# Patient Record
Sex: MALE | Race: WHITE | Employment: FULL TIME | URBAN - NONMETROPOLITAN AREA
[De-identification: names, ages, dates, MRNs, and addresses within clinical notes are randomized per-mention and may not be internally consistent; named-entity substitution may affect disease eponyms.]

---

## 2018-03-09 ENCOUNTER — HOSPITAL ENCOUNTER (EMERGENCY)
Age: 33
Discharge: HOME | End: 2018-03-09
Payer: COMMERCIAL

## 2018-03-09 VITALS
HEIGHT: 64 IN | WEIGHT: 170 LBS | HEART RATE: 74 BPM | TEMPERATURE: 98.7 F | SYSTOLIC BLOOD PRESSURE: 138 MMHG | OXYGEN SATURATION: 100 % | RESPIRATION RATE: 14 BRPM | BODY MASS INDEX: 29.02 KG/M2 | DIASTOLIC BLOOD PRESSURE: 89 MMHG

## 2018-03-09 DIAGNOSIS — G44.209 TENSION HEADACHE: Primary | ICD-10-CM

## 2018-03-09 PROCEDURE — 6370000000 HC RX 637 (ALT 250 FOR IP): Performed by: NURSE PRACTITIONER

## 2018-03-09 PROCEDURE — 6360000002 HC RX W HCPCS: Performed by: NURSE PRACTITIONER

## 2018-03-09 PROCEDURE — 96372 THER/PROPH/DIAG INJ SC/IM: CPT

## 2018-03-09 PROCEDURE — 99284 EMERGENCY DEPT VISIT MOD MDM: CPT

## 2018-03-09 RX ORDER — CYCLOBENZAPRINE HCL 10 MG
10 TABLET ORAL 3 TIMES DAILY PRN
Qty: 15 TABLET | Refills: 0 | Status: SHIPPED | OUTPATIENT
Start: 2018-03-09 | End: 2022-03-01

## 2018-03-09 RX ORDER — METOCLOPRAMIDE 10 MG/1
10 TABLET ORAL ONCE
Status: COMPLETED | OUTPATIENT
Start: 2018-03-09 | End: 2018-03-09

## 2018-03-09 RX ORDER — KETOROLAC TROMETHAMINE 30 MG/ML
30 INJECTION, SOLUTION INTRAMUSCULAR; INTRAVENOUS ONCE
Status: COMPLETED | OUTPATIENT
Start: 2018-03-09 | End: 2018-03-09

## 2018-03-09 RX ORDER — DIPHENHYDRAMINE HCL 25 MG
50 TABLET ORAL ONCE
Status: COMPLETED | OUTPATIENT
Start: 2018-03-09 | End: 2018-03-09

## 2018-03-09 RX ORDER — ORPHENADRINE CITRATE 30 MG/ML
60 INJECTION INTRAMUSCULAR; INTRAVENOUS ONCE
Status: COMPLETED | OUTPATIENT
Start: 2018-03-09 | End: 2018-03-09

## 2018-03-09 RX ADMIN — METOCLOPRAMIDE 10 MG: 10 TABLET ORAL at 22:40

## 2018-03-09 RX ADMIN — ORPHENADRINE CITRATE 60 MG: 30 INJECTION INTRAMUSCULAR; INTRAVENOUS at 22:42

## 2018-03-09 RX ADMIN — KETOROLAC TROMETHAMINE 30 MG: 30 INJECTION, SOLUTION INTRAMUSCULAR at 22:42

## 2018-03-09 RX ADMIN — DIPHENHYDRAMINE HCL 50 MG: 25 TABLET ORAL at 22:40

## 2018-03-09 ASSESSMENT — PAIN DESCRIPTION - FREQUENCY
FREQUENCY: CONTINUOUS
FREQUENCY: CONTINUOUS

## 2018-03-09 ASSESSMENT — PAIN DESCRIPTION - ONSET: ONSET: ON-GOING

## 2018-03-09 ASSESSMENT — PAIN SCALES - GENERAL
PAINLEVEL_OUTOF10: 2
PAINLEVEL_OUTOF10: 7
PAINLEVEL_OUTOF10: 7

## 2018-03-09 ASSESSMENT — PAIN DESCRIPTION - LOCATION
LOCATION: HEAD;NECK
LOCATION: HEAD;NECK

## 2018-03-09 ASSESSMENT — ENCOUNTER SYMPTOMS
PHOTOPHOBIA: 0
SHORTNESS OF BREATH: 0
NAUSEA: 0
VOMITING: 0

## 2018-03-09 ASSESSMENT — PAIN DESCRIPTION - DESCRIPTORS
DESCRIPTORS: HEADACHE
DESCRIPTORS: HEADACHE

## 2018-03-09 ASSESSMENT — PAIN DESCRIPTION - PROGRESSION: CLINICAL_PROGRESSION: GRADUALLY IMPROVING

## 2018-03-09 ASSESSMENT — PAIN DESCRIPTION - PAIN TYPE
TYPE: ACUTE PAIN
TYPE: ACUTE PAIN

## 2018-03-09 ASSESSMENT — PAIN DESCRIPTION - ORIENTATION: ORIENTATION: LEFT

## 2018-03-10 NOTE — ED PROVIDER NOTES
[facility] EMERGENCY DEPT      CHIEF COMPLAINT       Chief Complaint   Patient presents with    Migraine       Nurses Notes reviewed and I agree except as noted in the HPI. HISTORY OF PRESENT ILLNESS    Raegan Bose is a 34 y.o. male with a past medical history of migraines who presents to the Emergency Department for evaluation of a headache. The patient reports that his headache began five hours PTA and has gradually worsened with time. The pain is located in the left side of his posterior neck where he states there are currently spasms with radiation up into the left side of his head to his ear and then to his left eye. Patient describes his pain as constant throbbing that he currently rates as a 7/10 in severity without modifying factors. He states that his current headache is consistent with migraines he has had in the past. The patient denies any fever, chills, dizziness, lightheadedness, weakness, numbness/tingling, neck stiffness, vision changes, nausea, or vomiting. Patient adds that he has been having these headaches every day for a week. He is currently taking Amoxicillin for left otitis media. No further complaints at initial time of encounter. HPI was provided by the patient. REVIEW OF SYSTEMS     Review of Systems   Constitutional: Negative for activity change, appetite change, chills, diaphoresis, fatigue and fever. Eyes: Negative for photophobia and visual disturbance. Respiratory: Negative for shortness of breath. Cardiovascular: Negative for chest pain. Gastrointestinal: Negative for nausea and vomiting. Musculoskeletal: Positive for neck pain (spasms). Negative for neck stiffness. Allergic/Immunologic: Negative for immunocompromised state. Neurological: Positive for headaches. Negative for dizziness, weakness, light-headedness and numbness. Hematological: Does not bruise/bleed easily. Psychiatric/Behavioral: Negative for confusion.         PAST MEDICAL HISTORY Extremities well perfused; Good strength appreciated in all muscle groups. Lymphadenopathy:     He has no cervical adenopathy. Neurological: He is alert and oriented to person, place, and time. He has normal strength. No cranial nerve deficit or sensory deficit. Gait normal. GCS eye subscore is 4. GCS verbal subscore is 5. GCS motor subscore is 6. Cranial nerves II-XII intact. Skin: Skin is warm, dry and intact. No bruising and no rash noted. He is not diaphoretic. No pallor. Psychiatric: He has a normal mood and affect. His speech is normal and behavior is normal. Thought content normal. Cognition and memory are normal.   Nursing note and vitals reviewed. DIFFERENTIAL DIAGNOSIS:   Migraine headache, tension headache; do not suspect meningitis    DIAGNOSTIC RESULTS     EKG: All EKG's are interpreted by the Emergency Department Physician who either signs or Co-signs this chart in the absence of a cardiologist.  None    RADIOLOGY: non-plain film images(s) such as CT, Ultrasound and MRI are read by the radiologist.  Plain radiographic images are visualized and preliminarily interpreted by the emergency physician unless otherwise stated below. None    LABS:   Labs Reviewed - No data to display      EMERGENCY DEPARTMENT COURSE:   Vitals:    Vitals:    03/09/18 2206 03/09/18 2314   BP: (!) 146/97 138/89   Pulse: 71 74   Resp: 14 14   Temp: 98.7 °F (37.1 °C)    TempSrc: Oral    SpO2: 100% 100%   Weight: 170 lb (77.1 kg)    Height: 5' 4\" (1.626 m)        MDM    Medications   ketorolac (TORADOL) injection 30 mg (30 mg Intramuscular Given 3/9/18 2242)   orphenadrine (NORFLEX) injection 60 mg (60 mg Intramuscular Given 3/9/18 2242)   metoclopramide (REGLAN) tablet 10 mg (10 mg Oral Given 3/9/18 2240)   diphenhydrAMINE (BENADRYL) tablet 50 mg (50 mg Oral Given 3/9/18 2240)       Patient was seen and evaluated in the emergency department for reports of headache on the left side.   Physical exam reveals that the patient's pain originated in his neck and radiated into his head which is suspicious for a tension headache. Patient reports that he has been under stress with his new child and has been also lifting her frequently. He is medicated with a migraine cocktail as well as a muscle relaxer which he states provided significant relief. He'll be discharged home with a prescription for Flexeril is instructed to continue his over-the-counter treatments that he has been using. She is instructed to return for any new or worsening symptoms including fever and to follow-up with his PCP on an outpatient basis. CRITICAL CARE:   None    CONSULTS:  None    PROCEDURES:  None    FINAL IMPRESSION      1. Tension headache          DISPOSITION/PLAN   The patient was discharged. PATIENT REFERRED TO:  Romina Lorenzo  [address] [place] [address]  [phone]    Call in 3 days        DISCHARGE MEDICATIONS:  New Prescriptions    CYCLOBENZAPRINE (FLEXERIL) 10 MG TABLET    Take 1 tablet by mouth 3 times daily as needed for Muscle spasms . Do not drive or operate heavy machinery while taking this medication. (Please note that portions of this note were completed with a voice recognition program.  Efforts were made to edit the dictations but occasionally words are mis-transcribed.)    Scribe:  Baron Soto 3/9/18 10:18 PM Scribing for and in the presence of Pablo Arenas CNP. Signed by: Garry6 E [place]over, 3/9/18 11:47 PM    Provider:  I personally performed the services described in the documentation, reviewed and edited the documentation which was dictated to the scribe in my presence, and it accurately records my words and actions.     Pablo Arenas CNP 03/09/18 11:47 PM    Eulalio Bustos 15, APRN  03/09/18 8844

## 2018-03-13 ENCOUNTER — HOSPITAL ENCOUNTER (EMERGENCY)
Age: 33
Discharge: HOME | End: 2018-03-14
Payer: COMMERCIAL

## 2018-03-13 DIAGNOSIS — G44.019 EPISODIC CLUSTER HEADACHE, NOT INTRACTABLE: Primary | ICD-10-CM

## 2018-03-13 PROCEDURE — 99283 EMERGENCY DEPT VISIT LOW MDM: CPT

## 2018-03-13 ASSESSMENT — PAIN DESCRIPTION - LOCATION: LOCATION: HEAD

## 2018-03-13 ASSESSMENT — PAIN SCALES - GENERAL: PAINLEVEL_OUTOF10: 8

## 2018-03-14 VITALS
OXYGEN SATURATION: 99 % | TEMPERATURE: 97.8 F | SYSTOLIC BLOOD PRESSURE: 136 MMHG | HEART RATE: 75 BPM | RESPIRATION RATE: 18 BRPM | DIASTOLIC BLOOD PRESSURE: 81 MMHG

## 2018-03-14 PROCEDURE — 6360000002 HC RX W HCPCS: Performed by: PHYSICIAN ASSISTANT

## 2018-03-14 PROCEDURE — 96372 THER/PROPH/DIAG INJ SC/IM: CPT

## 2018-03-14 RX ORDER — ONDANSETRON 4 MG/1
4 TABLET, ORALLY DISINTEGRATING ORAL ONCE
Status: COMPLETED | OUTPATIENT
Start: 2018-03-14 | End: 2018-03-14

## 2018-03-14 RX ORDER — BUTALBITAL, ACETAMINOPHEN AND CAFFEINE 300; 40; 50 MG/1; MG/1; MG/1
1 CAPSULE ORAL EVERY 4 HOURS PRN
Qty: 84 CAPSULE | Refills: 0 | Status: SHIPPED | OUTPATIENT
Start: 2018-03-14

## 2018-03-14 RX ORDER — ONDANSETRON 4 MG/1
4 TABLET, ORALLY DISINTEGRATING ORAL EVERY 8 HOURS PRN
Qty: 10 TABLET | Refills: 0 | Status: SHIPPED | OUTPATIENT
Start: 2018-03-14 | End: 2022-03-01

## 2018-03-14 RX ORDER — SUMATRIPTAN 6 MG/.5ML
6 INJECTION, SOLUTION SUBCUTANEOUS ONCE
Status: COMPLETED | OUTPATIENT
Start: 2018-03-14 | End: 2018-03-14

## 2018-03-14 RX ADMIN — ONDANSETRON 4 MG: 4 TABLET, ORALLY DISINTEGRATING ORAL at 00:23

## 2018-03-14 RX ADMIN — SUMATRIPTAN 6 MG: 6 INJECTION, SOLUTION SUBCUTANEOUS at 00:23

## 2018-03-14 ASSESSMENT — ENCOUNTER SYMPTOMS
RHINORRHEA: 0
ABDOMINAL PAIN: 0
BACK PAIN: 0
SORE THROAT: 0
PHOTOPHOBIA: 1
EYE DISCHARGE: 0
DIARRHEA: 0
NAUSEA: 1
VOMITING: 1
WHEEZING: 0
EYE REDNESS: 0
SHORTNESS OF BREATH: 0
COUGH: 0

## 2018-03-14 NOTE — ED PROVIDER NOTES
reasonable. The headache was not thunder-clap or abrupt in onset. There have been no abnormal neurological findings on exam or re-exam. There has been no fever or meningeal signs suspicious for meningitis or intracranial infection. The patient and/or family and I have discussed the diagnosis and risks, and we agree with discharging home to follow-up with their primary doctor. We also discussed returning to the Emergency Department immediately if new or worsening symptoms occur. We have discussed the symptoms which are most concerning (e.g., changing or worsening pain, weakness, vomiting, neck stiffness,  fever) that necessitate immediate return. CRITICAL CARE:   None    CONSULTS:  None    PROCEDURES:  None    FINAL IMPRESSION      1. Episodic cluster headache, not intractable          DISPOSITION/PLAN     1. Episodic cluster headache, not intractable        PATIENT REFERRED TO:  Loree Sepulveda[address]  [phone]    Schedule an appointment as soon as possible for a visit in 1 day      Janelle Bruno MD  [address]  [phone]    Schedule an appointment as soon as possible for a visit         DISCHARGE MEDICATIONS:  New Prescriptions    BUTALBITAL-APAP-CAFFEINE (FIORICET) -40 MG CAPS PER CAPSULE    Take 1 capsule by mouth every 4 hours as needed for Headaches    ONDANSETRON (ZOFRAN ODT) 4 MG DISINTEGRATING TABLET    Take 1 tablet by mouth every 8 hours as needed for Nausea       (Please note that portions of this note were completed with a voice recognition program.  Efforts were made to edit the dictations but occasionally words are mis-transcribed.)    Scribe:  Tanya Maciel 3/14/18 12:11 AM Scribing for and in the presence of ALLAN Davila.     Signed by: [place]auer, 03/14/18 1:06 AM    Provider:  I personally performed the services described in the documentation, reviewed and edited the documentation which was dictated to the scribe in my presence, and it accurately records my words and actions.     ALLAN Davila 03/14/18 1:06 AM    ALLAN Davila PA-C  03/14/18 0111

## 2018-05-04 ENCOUNTER — HOSPITAL ENCOUNTER (OUTPATIENT)
Dept: MRI IMAGING | Age: 33
Discharge: HOME | End: 2018-05-04
Payer: COMMERCIAL

## 2018-05-04 DIAGNOSIS — R51.9 NONINTRACTABLE EPISODIC HEADACHE, UNSPECIFIED HEADACHE TYPE: ICD-10-CM

## 2018-05-04 PROCEDURE — 70551 MRI BRAIN STEM W/O DYE: CPT

## 2021-12-27 ENCOUNTER — APPOINTMENT (OUTPATIENT)
Dept: CT IMAGING | Age: 36
End: 2021-12-27
Payer: COMMERCIAL

## 2021-12-27 ENCOUNTER — HOSPITAL ENCOUNTER (EMERGENCY)
Age: 36
Discharge: HOME | End: 2021-12-27
Attending: EMERGENCY MEDICINE
Payer: COMMERCIAL

## 2021-12-27 VITALS
TEMPERATURE: 98.4 F | HEART RATE: 71 BPM | DIASTOLIC BLOOD PRESSURE: 80 MMHG | RESPIRATION RATE: 18 BRPM | SYSTOLIC BLOOD PRESSURE: 132 MMHG | BODY MASS INDEX: 26.66 KG/M2 | WEIGHT: 160 LBS | OXYGEN SATURATION: 100 % | HEIGHT: 65 IN

## 2021-12-27 DIAGNOSIS — R20.2 PARESTHESIAS: Primary | ICD-10-CM

## 2021-12-27 LAB
ALBUMIN SERPL-MCNC: 4.7 G/DL (ref 3.5–5.1)
ALP BLD-CCNC: 60 U/L (ref 38–126)
ALT SERPL-CCNC: 11 U/L (ref 11–66)
AMPHETAMINE+METHAMPHETAMINE URINE SCREEN: NEGATIVE
ANION GAP SERPL CALCULATED.3IONS-SCNC: 12 MEQ/L (ref 8–16)
AST SERPL-CCNC: 13 U/L (ref 5–40)
BARBITURATE QUANTITATIVE URINE: POSITIVE
BASOPHILS # BLD: 0.4 %
BASOPHILS ABSOLUTE: 0 THOU/MM3 (ref 0–0.1)
BENZODIAZEPINE QUANTITATIVE URINE: NEGATIVE
BILIRUB SERPL-MCNC: 0.3 MG/DL (ref 0.3–1.2)
BILIRUBIN URINE: NEGATIVE
BLOOD, URINE: NEGATIVE
BUN BLDV-MCNC: 18 MG/DL (ref 7–22)
CALCIUM SERPL-MCNC: 9.9 MG/DL (ref 8.5–10.5)
CANNABINOID QUANTITATIVE URINE: POSITIVE
CHARACTER, URINE: CLEAR
CHLORIDE BLD-SCNC: 101 MEQ/L (ref 98–111)
CO2: 28 MEQ/L (ref 23–33)
COCAINE METABOLITE QUANTITATIVE URINE: NEGATIVE
COLOR: YELLOW
CREAT SERPL-MCNC: 0.9 MG/DL (ref 0.4–1.2)
EOSINOPHIL # BLD: 2 %
EOSINOPHILS ABSOLUTE: 0.2 THOU/MM3 (ref 0–0.4)
ERYTHROCYTE [DISTWIDTH] IN BLOOD BY AUTOMATED COUNT: 12.3 % (ref 11.5–14.5)
ERYTHROCYTE [DISTWIDTH] IN BLOOD BY AUTOMATED COUNT: 39.3 FL (ref 35–45)
ETHYL ALCOHOL, SERUM: < 0.01 %
GFR SERPL CREATININE-BSD FRML MDRD: > 90 ML/MIN/1.73M2
GLUCOSE BLD-MCNC: 102 MG/DL (ref 70–108)
GLUCOSE URINE: NEGATIVE MG/DL
HCT VFR BLD CALC: 40.8 % (ref 42–52)
HEMOGLOBIN: 13.6 GM/DL (ref 14–18)
IMMATURE GRANS (ABS): 0.03 THOU/MM3 (ref 0–0.07)
IMMATURE GRANULOCYTES: 0.3 %
KETONES, URINE: ABNORMAL
LEUKOCYTE ESTERASE, URINE: NEGATIVE
LYMPHOCYTES # BLD: 28.2 %
LYMPHOCYTES ABSOLUTE: 2.7 THOU/MM3 (ref 1–4.8)
MAGNESIUM: 2.1 MG/DL (ref 1.6–2.4)
MCH RBC QN AUTO: 29.1 PG (ref 26–33)
MCHC RBC AUTO-ENTMCNC: 33.3 GM/DL (ref 32.2–35.5)
MCV RBC AUTO: 87.4 FL (ref 80–94)
MONOCYTES # BLD: 6.9 %
MONOCYTES ABSOLUTE: 0.7 THOU/MM3 (ref 0.4–1.3)
NITRITE, URINE: NEGATIVE
NUCLEATED RED BLOOD CELLS: 0 /100 WBC
OPIATES, URINE: NEGATIVE
OSMOLALITY CALCULATION: 283.4 MOSMOL/KG (ref 275–300)
OXYCODONE: NEGATIVE
PH UA: 5.5 (ref 5–9)
PHENCYCLIDINE QUANTITATIVE URINE: NEGATIVE
PLATELET # BLD: 210 THOU/MM3 (ref 130–400)
PMV BLD AUTO: 9.9 FL (ref 9.4–12.4)
POTASSIUM SERPL-SCNC: 3.9 MEQ/L (ref 3.5–5.2)
PROTEIN UA: NEGATIVE
RBC # BLD: 4.67 MILL/MM3 (ref 4.7–6.1)
SEG NEUTROPHILS: 62.2 %
SEGMENTED NEUTROPHILS ABSOLUTE COUNT: 6 THOU/MM3 (ref 1.8–7.7)
SODIUM BLD-SCNC: 141 MEQ/L (ref 135–145)
SPECIFIC GRAVITY, URINE: 1.02 (ref 1–1.03)
TOTAL PROTEIN: 6.8 G/DL (ref 6.1–8)
UROBILINOGEN, URINE: 0.2 EU/DL (ref 0–1)
WBC # BLD: 9.6 THOU/MM3 (ref 4.8–10.8)

## 2021-12-27 PROCEDURE — 36415 COLL VENOUS BLD VENIPUNCTURE: CPT

## 2021-12-27 PROCEDURE — 81003 URINALYSIS AUTO W/O SCOPE: CPT

## 2021-12-27 PROCEDURE — 72125 CT NECK SPINE W/O DYE: CPT

## 2021-12-27 PROCEDURE — 70450 CT HEAD/BRAIN W/O DYE: CPT

## 2021-12-27 PROCEDURE — 85025 COMPLETE CBC W/AUTO DIFF WBC: CPT

## 2021-12-27 PROCEDURE — 93005 ELECTROCARDIOGRAM TRACING: CPT | Performed by: EMERGENCY MEDICINE

## 2021-12-27 PROCEDURE — 80053 COMPREHEN METABOLIC PANEL: CPT

## 2021-12-27 PROCEDURE — 99282 EMERGENCY DEPT VISIT SF MDM: CPT

## 2021-12-27 PROCEDURE — 80307 DRUG TEST PRSMV CHEM ANLYZR: CPT

## 2021-12-27 PROCEDURE — 83735 ASSAY OF MAGNESIUM: CPT

## 2021-12-27 PROCEDURE — 82077 ASSAY SPEC XCP UR&BREATH IA: CPT

## 2021-12-27 ASSESSMENT — ENCOUNTER SYMPTOMS
COUGH: 0
ABDOMINAL PAIN: 0
BACK PAIN: 0
VOMITING: 0
SHORTNESS OF BREATH: 0

## 2021-12-28 LAB
EKG ATRIAL RATE: 63 BPM
EKG P AXIS: 74 DEGREES
EKG P-R INTERVAL: 120 MS
EKG Q-T INTERVAL: 380 MS
EKG QRS DURATION: 88 MS
EKG QTC CALCULATION (BAZETT): 388 MS
EKG R AXIS: 65 DEGREES
EKG T AXIS: 44 DEGREES
EKG VENTRICULAR RATE: 63 BPM

## 2021-12-28 NOTE — ED NOTES
Pt refusing COVID swab.  Urine specimen collected and sent to the lab     Rosey Astudillo RN  12/27/21 7901

## 2021-12-28 NOTE — ED PROVIDER NOTES
[facility] [address] [address] EMERGENCY DEPT    EMERGENCY MEDICINE     Pt Name: Kamila Guillen  MRN: [id number]  [place] 1985  Date of evaluation: 12/27/2021  Provider: Noe Bryant MD    CHIEF COMPLAINT       Chief Complaint   Patient presents with    Tingling       HISTORY OF PRESENT ILLNESS    Kamila Guillen is a pleasant 39 y.o. male   Presents to the emergency department from home complaining of tingling to lips and b/l hands and b/l feet. Symptoms started yesterday. He denies any pain, denies any neck/back pain, denies headache, denies cp, denies fever, denies n/v.  He reports the symptoms improve when hes walking around. No other complaints, no other known exacerbating/relieving factors. Triage notes and Nursing notes were reviewed by myself. Any discrepancies are addressed above. PAST MEDICAL HISTORY   No past medical history on file. SURGICAL HISTORY     No past surgical history on file. CURRENT MEDICATIONS       Previous Medications    BUTALBITAL-APAP-CAFFEINE (FIORICET) -40 MG CAPS PER CAPSULE    Take 1 capsule by mouth every 4 hours as needed for Headaches    CYCLOBENZAPRINE (FLEXERIL) 10 MG TABLET    Take 1 tablet by mouth 3 times daily as needed for Muscle spasms . Do not drive or operate heavy machinery while taking this medication. ONDANSETRON (ZOFRAN ODT) 4 MG DISINTEGRATING TABLET    Take 1 tablet by mouth every 8 hours as needed for Nausea       ALLERGIES     Patient has no known allergies. FAMILY HISTORY     No family history on file.      SOCIAL HISTORY       Social History     Socioeconomic History    Marital status:      Spouse name: Not on file    Number of children: Not on file    Years of education: Not on file    Highest education level: Not on file   Occupational History    Not on file   Tobacco Use    Smoking status: Not on file    Smokeless tobacco: Not on file   Substance and Sexual Activity    Alcohol use: Not on file    Drug use: Not on file  Sexual activity: Not on file   Other Topics Concern    Not on file   Social History Narrative    Not on file     Social Determinants of Health     Financial Resource Strain:     Difficulty of Paying Living Expenses: Not on file   Food Insecurity:     Worried About Running Out of Food in the Last Year: Not on file    Jamison of Food in the Last Year: Not on file   Transportation Needs:     Lack of Transportation (Medical): Not on file    Lack of Transportation (Non-Medical): Not on file   Physical Activity:     Days of Exercise per Week: Not on file    Minutes of Exercise per Session: Not on file   Stress:     Feeling of Stress : Not on file   Social Connections:     Frequency of Communication with Friends and Family: Not on file    Frequency of Social Gatherings with Friends and Family: Not on file    Attends [employer] Services: Not on file    Active Member of [address] [place] [address] [employer] or Organizations: Not on file    Attends Club or Organization Meetings: Not on file    Marital Status: Not on file   Intimate Partner Violence:     Fear of Current or Ex-Partner: Not on file    Emotionally Abused: Not on file    Physically Abused: Not on file    Sexually Abused: Not on file   Housing Stability:     Unable to Pay for Housing in the Last Year: Not on file    Number of [place] in the Last Year: Not on file    Unstable Housing in the Last Year: Not on file       REVIEW OF SYSTEMS     Review of Systems   Constitutional: Negative for chills and fever. Respiratory: Negative for cough and shortness of breath. Cardiovascular: Negative for chest pain and leg swelling. Gastrointestinal: Negative for abdominal pain and vomiting. Musculoskeletal: Negative for back pain and neck pain. Skin: Negative for rash and wound. Neurological: Negative for weakness. All other systems reviewed and are negative. Except as noted above the remainder of the review of systems was reviewed and is.    PHYSICAL EXAM (up to 7 for level 4, 8 or more for level 5)     ED Triage Vitals   BP Temp Temp src Pulse Resp SpO2 Height Weight   12/27/21 2031 12/27/21 2029 -- 12/27/21 2029 12/27/21 2029 12/27/21 2029 12/27/21 2029 12/27/21 2029   132/80 98.4 °F (36.9 °C)  71 18 100 % 5' 5\" (1.651 m) 160 lb (72.6 kg)       Physical Exam  Vitals and nursing note reviewed. Constitutional:       General: He is awake. HENT:      Head: Normocephalic and atraumatic. Mouth/Throat:      Mouth: Mucous membranes are moist.   Eyes:      General: Lids are normal.      Conjunctiva/sclera: Conjunctivae normal.   Neck:      Vascular: No JVD. Trachea: No tracheal deviation. Cardiovascular:      Rate and Rhythm: Normal rate and regular rhythm. Pulses: Normal pulses. Heart sounds: No murmur heard. No friction rub. No gallop. Pulmonary:      Effort: Pulmonary effort is normal.      Breath sounds: Normal breath sounds. No wheezing, rhonchi or rales. Abdominal:      Palpations: Abdomen is soft. Tenderness: There is no abdominal tenderness. Musculoskeletal:      Cervical back: Neck supple. Skin:     General: Skin is warm. Findings: No rash. Neurological:      General: No focal deficit present. Mental Status: He is alert. Motor: No weakness. Comments: cn2-12 intact, normal sensation to LT in all ext equal b/l, 5/5 strength in all ext, normal gait, normal speech, normal coordination with finger to nose   Psychiatric:         Behavior: Behavior is cooperative.          DIAGNOSTIC RESULTS     EKG:(none if blank)  All E[facility]'s are [facility] who either signs or Co-signs this chart in the absence of a cardiologist.        RADIOLOGY: (none if blank)   Interpretation per the Radiologistbelow, if available at the time of this note:    CT CERVICAL SPINE WO CONTRAST   Final Result   Normal noncontrast CT cervical spine            **This report has been created using voice recognition software. It may contain minor errors which are inherent in voice recognition technology. **      Final report electronically signed by Dr. Tiffany Kaur on 12/27/2021 9:50 PM      CT HEAD WO CONTRAST   Final Result   Normal noncontrast CT brain. No acute intracranial process. .          **This report has been created using voice recognition software. It may contain minor errors which are inherent in voice recognition technology. **      Final report electronically signed by Dr. Tiffany Kaur on 12/27/2021 9:49 PM          LABS:  Labs Reviewed   CBC WITH AUTO DIFFERENTIAL - Abnormal; Notable for the following components:       Result Value    RBC 4.67 (*)     Hemoglobin 13.6 (*)     Hematocrit 40.8 (*)     All other components within normal limits   MAGNESIUM   COMPREHENSIVE METABOLIC PANEL   ETHANOL   URINE DRUG SCREEN   ANION GAP   GLOMERULAR FILTRATION RATE, ESTIMATED   OSMOLALITY   URINE RT REFLEX TO CULTURE       All other labs were within normal range or not returned as of this dictation. Please note, any cultures that may have been sent were not resulted at the time of this patient visit. EMERGENCY DEPARTMENT COURSE andMedical Decision Making:     MDM/   Pt presents to the ER with nonspecific paresthesias in b/l hands and feet in stalking/glove distribution as well as lips. Resolved here when walking. Unclear etiology, symptoms not c/w acs/cva/sepsis, patient stable for dc home, counsled regarding need for f/u and ER return precautions. Strict returnprecautions and follow up instructions were discussed with the patient with which the patient agrees      ED Medications administered this visit:  Medications - No data to display      Procedures: (None if blank)    The patient was seen at a time when COVID-19 was severely surging in the region, placing a significant strain on available resources and personnel. CLINICAL IMPRESSION     1.  Paresthesias          DISPOSITION/PLAN DISPOSITION Decision To Discharge 12/27/2021 10:58:15 PM      PATIENT REFERRED TO:  Jaydon Sepulveda[address]  [phone]    In 2 days      Ford Campbell, [address] [phone]    In 2 days        DISCHARGE MEDICATIONS:  New Prescriptions    No medications on file     @[facility]([id number]:LAST:1)      (Please note that portions of this note were completed with a voice recognition program.  Efforts were made to edit the dictations but occasionallywords are mis-transcribed.)      Lesley Sarkar MD,FACEP (electronically signed)  Attending Physician, Emergency Department       Joe Stone MD  12/27/21 6887

## 2021-12-28 NOTE — ED TRIAGE NOTES
To ED from home with complaints of tingling in bilateral hands, left leg and bilateral arms. Patient states slight pain in finger nail beds. Patient vital signs stable and respirations unlabored.

## 2022-03-01 ENCOUNTER — INITIAL CONSULT (OUTPATIENT)
Dept: NEUROLOGY | Age: 37
End: 2022-03-01
Payer: COMMERCIAL

## 2022-03-01 VITALS
BODY MASS INDEX: 26.82 KG/M2 | SYSTOLIC BLOOD PRESSURE: 126 MMHG | DIASTOLIC BLOOD PRESSURE: 88 MMHG | WEIGHT: 161 LBS | HEIGHT: 65 IN | HEART RATE: 56 BPM

## 2022-03-01 DIAGNOSIS — R20.0 NUMBNESS: Primary | ICD-10-CM

## 2022-03-01 PROCEDURE — 99205 OFFICE O/P NEW HI 60 MIN: CPT | Performed by: PSYCHIATRY & NEUROLOGY

## 2022-03-01 RX ORDER — PAROXETINE HYDROCHLORIDE 12.5 MG/1
12.5 TABLET, FILM COATED, EXTENDED RELEASE ORAL EVERY MORNING
COMMUNITY

## 2022-03-01 NOTE — PATIENT INSTRUCTIONS
1. MRI brain W/WO contrast  2. VZV, HSV, EBV titers  3. Copper level  4. Iron studies  5. Vitamin B12, folate  6. Vitamin B6 level  7. Call with any new symptoms or concerns. 8. Follow up in 6 weeks.

## 2022-03-15 NOTE — PROGRESS NOTES
Chief Complaint   Patient presents with    Consultation     paresthesias       Carmelo Quiroz is a 39 y.o. male who presents today for evaluation of  Intermittent numbness and tingling in his tongue, lips, and then started in his bilateral hands and feet for the past 3 months. He is experiencing symptoms a few times each day. Symptoms can last a few minutes and then resolve. He does have previous history of migraine headaches however he is not experiencing any headache associated with his symptoms. He feels his symptoms are made worse when he is nervous or anxious. No weakness. No incontinence of bowel or bladder. He denies any cold or flu like illness prior to onset of these symptoms. No new changes in medication. No tick bites. CT head done 12/27/21 was normal. He denies chest pain. No shortness of breath, no neck pain. No vision changes. No dysphagia. No fever. No rash. No weight loss. History provided by patient. Past Medical History:   Diagnosis Date    Migraine        There is no problem list on file for this patient. No Known Allergies    Current Outpatient Medications   Medication Sig Dispense Refill    PARoxetine (PAXIL CR) 12.5 MG extended release tablet Take 12.5 mg by mouth every morning      butalbital-APAP-caffeine (FIORICET) -40 MG CAPS per capsule Take 1 capsule by mouth every 4 hours as needed for Headaches 84 capsule 0     No current facility-administered medications for this visit.        Social History     Socioeconomic History    Marital status:      Spouse name: None    Number of children: None    Years of education: None    Highest education level: None   Occupational History    None   Tobacco Use    Smoking status: Never Smoker    Smokeless tobacco: Never Used   Vaping Use    Vaping Use: Never used   Substance and Sexual Activity    Alcohol use: Yes     Comment: OCCASIONALLY    Drug use: Yes     Types: Marijuana Alpheus Brandy)    Sexual activity: None   Other Topics Concern    None   Social History Narrative    None     Social Determinants of Health     Financial Resource Strain:     Difficulty of Paying Living Expenses: Not on file   Food Insecurity:     Worried About Running Out of Food in the Last Year: Not on file    Jamison of Food in the Last Year: Not on file   Transportation Needs:     Lack of Transportation (Medical): Not on file    Lack of Transportation (Non-Medical): Not on file   Physical Activity:     Days of Exercise per Week: Not on file    Minutes of Exercise per Session: Not on file   Stress:     Feeling of Stress : Not on file   Social Connections:     Frequency of Communication with Friends and Family: Not on file    Frequency of Social Gatherings with Friends and Family: Not on file    Attends Hindu Services: Not on file    Active Member of [address] [place] [address] [employer] or Organizations: Not on file    Attends Club or Organization Meetings: Not on file    Marital Status: Not on file   Intimate Partner Violence:     Fear of Current or Ex-Partner: Not on file    Emotionally Abused: Not on file    Physically Abused: Not on file    Sexually Abused: Not on file   Housing Stability:     Unable to Pay for Housing in the Last Year: Not on file    Number of [place] in the Last Year: Not on file    Unstable Housing in the Last Year: Not on file       Family History   Problem Relation Age of Onset    Pancreatic Cancer Mother     Diabetes Father     Heart Disease Father     No Known Problems Sister     No Known Problems Brother          I reviewed the past medical history, allergies, medications, social history and family history.    Review of Systems   All systems reviewed, and are all negative, except what is mentioned in HPI      Vitals:    03/01/22 1502   BP: 126/88   Pulse: 56   Weight: 161 lb (73 kg)   Height: 5' 5\" (1.651 m)       Physical Examination:  General appearance - alert, well appearing, and in no distress, oriented to person, place, and time and normal weight  Mental status- Level of Alertness: awake  Orientation: person, place, time  Memory: normal  Fund of Knowledge: normal  Attention/Concentration: normal  Language: normal. Mood is normal.   Neck - supple, no significant adenopathy, carotids upstroke normal bilaterally. There is no axillary lymphadenopathy. There is no carotid bruit. No neck lymphadenopathy. No thyroid enlargement   Neurological -   Cranial Bjdhct-RM-JDO:.   Cranial nerve II: Normal   Cranial nerve III: Pupils: equal, round, reactive to light  Cranial nerves III, IV, VI: Extraocular Movements: intact   Cranial nerve V: Facial sensation: intact   Cranial nerve VII:Facial strength: intact   Cranial nerve VIII: Hearing: intact   Cranial nerve IX: Palate Elevation intact bilaterally  Cranial nerve XI: Shoulder shrug intact bilaterally  Cranial nerve XII: Tongue midline   neck supple without rigidity, there is no limitation of range of motion of the neck. DTR's are intact distal and symmetric  Babinski sign negative   Motor exam is 5/5 in the upper and lower extremities. Normal muscle tone. There is no muscle atrophy. Sensory is intact for light touch. Coordination: finger to nose,  intact  Gait and station intact   Abnormal movement none, vibration normal, proprioception normal  Skin - warm, dry to touch, normal coloration, no rashes, no suspicious skin lesions  Superficial temporal artery pulses are normal.   There is no limitation of range of motion of the neck. There is no resting tremor, no pin rolling, no bradykinesia, no Hypohonia, normal blink rate. Musculoskeletal: Has no hand arthritis, no limitation of ROM in any of the four extremities,. There is no leg edema. The Heart was regular in rate and rhythm. No heart murmur  Chest Clear, with  good effort. Abdomen soft, intact bowel sounds.            Results for orders placed during the hospital encounter of 05/04/18    MRI BRAIN WO CONTRAST    Narrative  PROCEDURE: MRI BRAIN WO CONTRAST    CLINICAL INFORMATION Nonintractable episodic headache, unspecified headache type. Additional history obtained from electronic medical record indicates the patient has had headaches for 3 months. COMPARISON: None available. TECHNIQUE: Multiplanar and multiple spin echo MRI images were obtained of the brain without contrast.    FINDINGS:    The brain parenchymal volume is age appropriate. No abnormal signal alteration is identified within the brain parenchyma. No restricted diffusion or abnormal susceptibility is present. The ventricles are midline without evidence of hydrocephalus. No  mass, mass effect or extra-axial fluid collection is identified. The basal cisterns and visualized vascular flow voids are patent. The pituitary, brain stem and cervical medullary junction are within normal limits. The visualized orbits and temporal bone structures are unremarkable. There is mild mucosal thickening within the bilateral frontal, ethmoid and maxillary sinuses. Impression  Unremarkable MRI of the brain. No acute intracranial abnormality is identified. **This report has been created using voice recognition software. It may contain minor errors which are inherent in voice recognition technology. **    Final report electronically signed by Dr. Jose Spears on 5/5/2018 2:01 PM    No results found for this or any previous visit. No results found for this or any previous visit.     Results for orders placed during the hospital encounter of 12/27/21    CT HEAD WO CONTRAST    Narrative  PROCEDURE: NONCONTRAST CT BRAIN    CLINICAL INFORMATION: Numbness and tingling upper extremity    COMPARISON: No prior study    TECHNIQUE: Multiple axial 5 mm images of the brain were obtained without administration of intravenous contrast material. ALL CT SCANS AT THIS FACILITY use dose modulation, iterative reconstruction, and/or weight-based dosing when appropriate to reduce  radiation dose to as low as reasonably achievable. FINDINGS:    VENTRICLES: Normal in size, contour, position. .. PARENCHYMA:  No acute infarction, mass lesion, or intracranial hemorrhage is seen. MASTOID PROCESSES: Well aerated. Normal in appearance. Roslynn Mutton PARANASAL SINUSES/CALVARIUM: Unremarkable    Impression  Normal noncontrast CT brain. No acute intracranial process. .      **This report has been created using voice recognition software. It may contain minor errors which are inherent in voice recognition technology. **    Final report electronically signed by Dr. Sanjay Choi on 12/27/2021 9:49 PM    We reviewed the patient records from referring provider and available information in the EHR       ASSESSMENT:      Diagnosis Orders   1. Numbness        This is a 39year old male who presents with episodic numbness and tingling of his tongue, lips, bilateral hands and feet for the past 3 months. He is experiencing symptoms a few times each day. Symptoms can last a few minutes and then resolve. His symptoms are worse when he is nervous or anxious. No weakness. No incontinence of bowel or bladder. there is no change in taste, there is no tongue fasciculation. He denies any cold or flu like illness prior to onset of these symptoms. No tick bites. I reviewed the CT  Brain and agree with interpretation, I also reviewed the patient pertinent labs and records in the EHR and from other providers. CT head done 12/27/21 was normal. His exam is nonfocal. His symptoms are subjective. The patient was counseled about his symptoms and work up recommended. We will arrange for him to undergo MRI brain W/WO contrast to evaluate for organic causes of numbness as well as lab work checking viral titers, trace elements, and vitamin levels. After detailed discussion with patient we agreed on the following plan. Plan    1. MRI brain W/WO contrast  2. VZV, HSV, EBV titers  3.  Copper level  4. Iron studies  5. Vitamin B12, folate  6. Vitamin B6 level  7. Call with any new symptoms or concerns. 8. Follow up in 6 weeks.      Total time 61 min    Collette Davison MD

## 2022-03-16 ENCOUNTER — HOSPITAL ENCOUNTER (OUTPATIENT)
Dept: MRI IMAGING | Age: 37
Discharge: HOME | End: 2022-03-16
Payer: COMMERCIAL

## 2022-03-16 DIAGNOSIS — R20.0 NUMBNESS: ICD-10-CM

## 2022-03-16 PROCEDURE — 70553 MRI BRAIN STEM W/O & W/DYE: CPT

## 2022-03-16 PROCEDURE — A9579 GAD-BASE MR CONTRAST NOS,1ML: HCPCS | Performed by: NURSE PRACTITIONER

## 2022-03-16 PROCEDURE — 6360000004 HC RX CONTRAST MEDICATION: Performed by: NURSE PRACTITIONER

## 2022-03-16 RX ADMIN — GADOTERIDOL 15 ML: 279.3 INJECTION, SOLUTION INTRAVENOUS at 14:51

## 2022-03-17 LAB
COPPER, SERUM: 1134 MCG/L (ref 665–1480)
EPSTEIN BARR VIRUS EARLY AB IGG: <0.2 AI
EPSTEIN BARR VIRUS NUCLEAR AB IGG: >8 AI
EPSTEIN-BARR VCA IGG: 5.2 AI
EPSTEIN-BARR VCA IGM: <0.2 AI
FERRITIN: 10 NG/ML (ref 24–336)
FOLATE: 7.4 NG/ML
HERPES SIMPLEX VIRUS 1 IGG: 2.9 AI
HERPES SIMPLEX VIRUS 2 IGG: <0.2 AI
HSV IGM AB SCREEN: 0.62 INDEX
IRON SATURATION: 23 % (ref 20–50)
IRON, SERUM: 91 UG/DL (ref 59–158)
TOTAL IRON BINDING CAPACITY: 392 UG/DL (ref 250–450)
UNSATURATED IRON BINDING CAPACITY: 301 UG/DL (ref 112–347)
VARICELLA ZOSTER AB IGM: 0.22 ISR
VITAMIN B-12: 248 PG/ML (ref 180–914)
VITAMIN B6: 25 NMOL/L (ref 20–125)
VZV IGG SER QL IA: 1.5 AI

## 2022-03-22 ENCOUNTER — TELEPHONE (OUTPATIENT)
Dept: NEUROLOGY | Age: 37
End: 2022-03-22

## 2022-03-22 NOTE — TELEPHONE ENCOUNTER
----- Message from LORIN Vidal CNP sent at 3/21/2022  6:51 PM EDT -----  Please let patient know his vitamin B12 level is low=248. He needs to start on vitamin B12 sublingual supplements, at least 3000 mcg daily, over the counter  His Ferritin level is also low=10, please ask him to fstart oral iron supplement, over the counter.   Please have him call the office in 2-3 months to have repeat iron studies and vitamin B12 level done  Carl Pringle CNP

## 2022-04-11 ENCOUNTER — OFFICE VISIT (OUTPATIENT)
Dept: NEUROLOGY | Age: 37
End: 2022-04-11
Payer: COMMERCIAL

## 2022-04-11 VITALS
WEIGHT: 161 LBS | SYSTOLIC BLOOD PRESSURE: 122 MMHG | OXYGEN SATURATION: 97 % | BODY MASS INDEX: 26.82 KG/M2 | HEIGHT: 65 IN | DIASTOLIC BLOOD PRESSURE: 74 MMHG | HEART RATE: 83 BPM

## 2022-04-11 DIAGNOSIS — R20.0 NUMBNESS: Primary | ICD-10-CM

## 2022-04-11 PROCEDURE — 99213 OFFICE O/P EST LOW 20 MIN: CPT | Performed by: NURSE PRACTITIONER

## 2022-04-11 NOTE — PATIENT INSTRUCTIONS
1. Continue with vitamin B12 sublingual supplements, at least 3000 mcg daily  2. Continue with iron supplements. 3. Repeat vitamin B12 level and iron studies in 2 months  4. Follow up as needed. 5. Call if any questions or concerns.

## 2022-04-11 NOTE — PROGRESS NOTES
NEUROLOGY OUT PATIENT FOLLOW UP NOTE:  4/11/20222:56 PM    [address] is here for follow up for numbness      ROS:  Respiratory : no cough, no shortness of breath  Cardiac: no chest pain. No palpitations. Renal : no flank pain, no hematuria    Skin: no rash      No Known Allergies    Current Outpatient Medications:     PARoxetine (PAXIL CR) 12.5 MG extended release tablet, Take 12.5 mg by mouth every morning, Disp: , Rfl:     butalbital-APAP-caffeine (FIORICET) -40 MG CAPS per capsule, Take 1 capsule by mouth every 4 hours as needed for Headaches, Disp: 84 capsule, Rfl: 0    I reviewed the past medical history, allergies, medications, social history and family history. PE:   Vitals:    04/11/22 1439   BP: 122/74   Site: Left Upper Arm   Position: Sitting   Cuff Size: Medium Adult   Pulse: 83   SpO2: 97%   Weight: 161 lb (73 kg)   Height: 5' 5\" (1.651 m)     General Appearance:  awake, alert, oriented, in no acute distress  Gen: NAD, Language is Intact. Skin: no rash, lesion, dry  to touch. warm  Head: no rash, no icterus  Neck: There is no carotid bruits. The Neck is supple. There is no neck lymphadenopathy. Neuro: CN 2-12 grossly intact with no focal deficits. Power 5/5 Throughout symmetric, Reflexes are +2 symmetric. Long tracts are intact. Cerebellar exam is Intact. Sensory exam is intact to light touch. Gait is intact. Musculoskeletal:  Has no hand arthritis, no limitation of ROM in any of the four extremities.   Lower extremities no edema          DATA:        Results for orders placed or performed in visit on 03/16/22   Iron Binding Capacity   Result Value Ref Range    Iron, Serum 91 59 - 158 UG/DL    UIBC 301 112 - 347 UG/DL    TIBC 392 250 - 450 UG/DL    Iron Saturation 23 20 - 50 %   Varicella Zoster Antibody, IgG   Result Value Ref Range    VARICELLA ZOSTER AB IGG 1.5 (H) <0.9 AI   Varicella Zoster Antibody, IgM   Result Value Ref Range    VARICELLA ZOSTER AB IGM 0.22 <0.91 ISR   HSV AB IGG, IGM   Result Value Ref Range    HSV I IgG 2.9 (H) <0.9 AI    HSV II IgG <0.2 <0.9 AI    HSV IGM AB SCREEN 0.62 SEE BELOW INDEX   MARLI-GRIDER PANEL   Result Value Ref Range    MARLI BARR VIRUS EARLY AB IGG <0.2 <0.9 AI    Marli Barr virus nuclear Ab IgG >8.0 (H) <0.9 AI    EBV VCA IgG 5.2 (H) <0.9 AI    EBV VCA IgM <0.2 <0.9 AI   Vitamin B6   Result Value Ref Range    Vitamin B6, Plasma 25 20 - 125 nmol/L   Vitamin B12 & Folate   Result Value Ref Range    Vitamin B-12 248 180 - 914 pg/mL    Folate 7.4 >5.8 ng/mL   Copper, Serum   Result Value Ref Range    Copper, Serum 1134 665 - 1480 MCG/L   Ferritin   Result Value Ref Range    Ferritin 10 (L) 24 - 336 ng/mL            Results for orders placed during the hospital encounter of 05/04/18    MRI BRAIN WO CONTRAST    Narrative  PROCEDURE: MRI BRAIN WO CONTRAST    CLINICAL INFORMATION Nonintractable episodic headache, unspecified headache type. Additional history obtained from electronic medical record indicates the patient has had headaches for 3 months. COMPARISON: None available. TECHNIQUE: Multiplanar and multiple spin echo MRI images were obtained of the brain without contrast.    FINDINGS:    The brain parenchymal volume is age appropriate. No abnormal signal alteration is identified within the brain parenchyma. No restricted diffusion or abnormal susceptibility is present. The ventricles are midline without evidence of hydrocephalus. No  mass, mass effect or extra-axial fluid collection is identified. The basal cisterns and visualized vascular flow voids are patent. The pituitary, brain stem and cervical medullary junction are within normal limits. The visualized orbits and temporal bone structures are unremarkable. There is mild mucosal thickening within the bilateral frontal, ethmoid and maxillary sinuses. Impression  Unremarkable MRI of the brain. No acute intracranial abnormality is identified.           **This report has been created of intravenous contrast material. ALL CT SCANS AT THIS FACILITY use dose modulation, iterative reconstruction, and/or weight-based dosing when appropriate to reduce  radiation dose to as low as reasonably achievable. FINDINGS:    VENTRICLES: Normal in size, contour, position. .. PARENCHYMA:  No acute infarction, mass lesion, or intracranial hemorrhage is seen. MASTOID PROCESSES: Well aerated. Normal in appearance. Karthikeyan Ryan PARANASAL SINUSES/CALVARIUM: Unremarkable    Impression  Normal noncontrast CT brain. No acute intracranial process. .      **This report has been created using voice recognition software. It may contain minor errors which are inherent in voice recognition technology. **    Final report electronically signed by Dr. Yuliet Bowman on 12/27/2021 9:49 PM         Assessment:     Diagnosis Orders   1. Numbness          He is doing some better, he can still experience episodic numbness at times in his tongue, lips, bilateral hands and feet especially when stressed out or anxious. I shared with the patient that his MRI brain W/WO contrast done 3/16/22 was normal. He also had lab work done that showed low vitamin B12 =248 and low ferritin= 10. He is on supplements for both. I offered him a trial of trileptal  to address his symptoms however he is wishing to hold off at this time. After detailed discussion with patient we agreed on the following plan. Plan:  1. Continue with vitamin B12 sublingual supplements, at least 3000 mcg daily  2. Continue with iron supplements, over the counter. 3. Repeat vitamin B12 level and iron studies in 2 months  4. Consider Trileptal as a next step  5. Follow up as needed. 6. Call if any questions or concerns.     Total time 24  min    LORIN Banks - CNP